# Patient Record
Sex: MALE | Race: WHITE | NOT HISPANIC OR LATINO | Employment: FULL TIME | URBAN - METROPOLITAN AREA
[De-identification: names, ages, dates, MRNs, and addresses within clinical notes are randomized per-mention and may not be internally consistent; named-entity substitution may affect disease eponyms.]

---

## 2020-08-03 ENCOUNTER — LAB (OUTPATIENT)
Dept: LAB | Facility: HOSPITAL | Age: 50
End: 2020-08-03

## 2020-08-03 ENCOUNTER — TRANSCRIBE ORDERS (OUTPATIENT)
Dept: ADMINISTRATIVE | Facility: HOSPITAL | Age: 50
End: 2020-08-03

## 2020-08-03 DIAGNOSIS — M25.562 PAIN AND SWELLING OF LEFT KNEE: Primary | ICD-10-CM

## 2020-08-03 DIAGNOSIS — M25.462 PAIN AND SWELLING OF LEFT KNEE: Primary | ICD-10-CM

## 2020-08-03 DIAGNOSIS — M25.462 PAIN AND SWELLING OF LEFT KNEE: ICD-10-CM

## 2020-08-03 DIAGNOSIS — M25.562 PAIN AND SWELLING OF LEFT KNEE: ICD-10-CM

## 2020-08-03 LAB
BASOPHILS # BLD AUTO: 0.04 10*3/MM3 (ref 0–0.2)
BASOPHILS NFR BLD AUTO: 0.4 % (ref 0–1.5)
CHROMATIN AB SERPL-ACNC: <10 IU/ML (ref 0–14)
CRP SERPL-MCNC: 0.26 MG/DL (ref 0–0.5)
DEPRECATED RDW RBC AUTO: 41.2 FL (ref 37–54)
EOSINOPHIL # BLD AUTO: 0.01 10*3/MM3 (ref 0–0.4)
EOSINOPHIL NFR BLD AUTO: 0.1 % (ref 0.3–6.2)
ERYTHROCYTE [DISTWIDTH] IN BLOOD BY AUTOMATED COUNT: 13.4 % (ref 12.3–15.4)
ERYTHROCYTE [SEDIMENTATION RATE] IN BLOOD: 19 MM/HR (ref 0–20)
HCT VFR BLD AUTO: 41.9 % (ref 37.5–51)
HGB BLD-MCNC: 13.8 G/DL (ref 13–17.7)
IMM GRANULOCYTES # BLD AUTO: 0.08 10*3/MM3 (ref 0–0.05)
IMM GRANULOCYTES NFR BLD AUTO: 0.7 % (ref 0–0.5)
LYMPHOCYTES # BLD AUTO: 1.85 10*3/MM3 (ref 0.7–3.1)
LYMPHOCYTES NFR BLD AUTO: 16.6 % (ref 19.6–45.3)
MCH RBC QN AUTO: 27.9 PG (ref 26.6–33)
MCHC RBC AUTO-ENTMCNC: 32.9 G/DL (ref 31.5–35.7)
MCV RBC AUTO: 84.6 FL (ref 79–97)
MONOCYTES # BLD AUTO: 0.64 10*3/MM3 (ref 0.1–0.9)
MONOCYTES NFR BLD AUTO: 5.8 % (ref 5–12)
NEUTROPHILS NFR BLD AUTO: 76.4 % (ref 42.7–76)
NEUTROPHILS NFR BLD AUTO: 8.51 10*3/MM3 (ref 1.7–7)
NRBC BLD AUTO-RTO: 0 /100 WBC (ref 0–0.2)
PLATELET # BLD AUTO: 502 10*3/MM3 (ref 140–450)
PMV BLD AUTO: 10.1 FL (ref 6–12)
RBC # BLD AUTO: 4.95 10*6/MM3 (ref 4.14–5.8)
URATE SERPL-MCNC: 6.4 MG/DL (ref 3.4–7)
WBC # BLD AUTO: 11.13 10*3/MM3 (ref 3.4–10.8)

## 2020-08-03 PROCEDURE — 86431 RHEUMATOID FACTOR QUANT: CPT

## 2020-08-03 PROCEDURE — 36415 COLL VENOUS BLD VENIPUNCTURE: CPT

## 2020-08-03 PROCEDURE — 86038 ANTINUCLEAR ANTIBODIES: CPT

## 2020-08-03 PROCEDURE — 84550 ASSAY OF BLOOD/URIC ACID: CPT

## 2020-08-03 PROCEDURE — 85025 COMPLETE CBC W/AUTO DIFF WBC: CPT

## 2020-08-03 PROCEDURE — 86140 C-REACTIVE PROTEIN: CPT

## 2020-08-03 PROCEDURE — 85652 RBC SED RATE AUTOMATED: CPT

## 2020-08-04 LAB — ANA SER QL: NEGATIVE

## 2020-12-11 ENCOUNTER — HOSPITAL ENCOUNTER (OUTPATIENT)
Dept: SURGERY | Facility: CLINIC | Age: 50
Discharge: HOME | End: 2020-12-11
Attending: UROLOGY

## 2020-12-11 ENCOUNTER — OFFICE VISIT CONVERTED (OUTPATIENT)
Dept: UROLOGY | Facility: CLINIC | Age: 50
End: 2020-12-11
Attending: UROLOGY

## 2020-12-11 ENCOUNTER — CONVERSION ENCOUNTER (OUTPATIENT)
Dept: SURGERY | Facility: CLINIC | Age: 50
End: 2020-12-11

## 2020-12-13 LAB — BACTERIA UR CULT: NORMAL

## 2020-12-24 ENCOUNTER — HOSPITAL ENCOUNTER (OUTPATIENT)
Dept: PREADMISSION TESTING | Facility: HOSPITAL | Age: 50
Discharge: HOME | End: 2020-12-24
Attending: UROLOGY

## 2020-12-24 LAB
ALBUMIN SERPL-MCNC: 3.5 G/DL (ref 3.5–5)
ALBUMIN/GLOB SERPL: 0.7 {RATIO} (ref 1.4–2.6)
ALP SERPL-CCNC: 87 U/L (ref 56–119)
ALT SERPL-CCNC: 11 U/L (ref 10–40)
ANION GAP SERPL CALC-SCNC: 15 MMOL/L (ref 8–19)
AST SERPL-CCNC: 12 U/L (ref 15–50)
BASOPHILS # BLD AUTO: 0.04 10*3/UL (ref 0–0.2)
BASOPHILS NFR BLD AUTO: 0.5 % (ref 0–3)
BILIRUB SERPL-MCNC: 0.21 MG/DL (ref 0.2–1.3)
BUN SERPL-MCNC: 21 MG/DL (ref 5–25)
BUN/CREAT SERPL: 22 {RATIO} (ref 6–20)
CALCIUM SERPL-MCNC: 9.8 MG/DL (ref 8.7–10.4)
CHLORIDE SERPL-SCNC: 102 MMOL/L (ref 99–111)
CONV ABS IMM GRAN: 0.06 10*3/UL (ref 0–0.2)
CONV CO2: 22 MMOL/L (ref 22–32)
CONV IMMATURE GRAN: 0.7 % (ref 0–1.8)
CONV TOTAL PROTEIN: 8.3 G/DL (ref 6.3–8.2)
CREAT UR-MCNC: 0.95 MG/DL (ref 0.7–1.2)
DEPRECATED RDW RBC AUTO: 41.3 FL (ref 35.1–43.9)
EOSINOPHIL # BLD AUTO: 0.1 10*3/UL (ref 0–0.7)
EOSINOPHIL # BLD AUTO: 1.2 % (ref 0–7)
ERYTHROCYTE [DISTWIDTH] IN BLOOD BY AUTOMATED COUNT: 13.6 % (ref 11.6–14.4)
GFR SERPLBLD BASED ON 1.73 SQ M-ARVRAT: >60 ML/MIN/{1.73_M2}
GLOBULIN UR ELPH-MCNC: 4.8 G/DL (ref 2–3.5)
GLUCOSE SERPL-MCNC: 200 MG/DL (ref 70–99)
HCT VFR BLD AUTO: 33.9 % (ref 42–52)
HGB BLD-MCNC: 11.2 G/DL (ref 14–18)
INR PPP: 1.08 (ref 2–3)
LYMPHOCYTES # BLD AUTO: 1.13 10*3/UL (ref 1–5)
LYMPHOCYTES NFR BLD AUTO: 13.9 % (ref 20–45)
MCH RBC QN AUTO: 27.5 PG (ref 27–31)
MCHC RBC AUTO-ENTMCNC: 33 G/DL (ref 33–37)
MCV RBC AUTO: 83.3 FL (ref 80–96)
MONOCYTES # BLD AUTO: 0.46 10*3/UL (ref 0.2–1.2)
MONOCYTES NFR BLD AUTO: 5.7 % (ref 3–10)
NEUTROPHILS # BLD AUTO: 6.34 10*3/UL (ref 2–8)
NEUTROPHILS NFR BLD AUTO: 78 % (ref 30–85)
NRBC CBCN: 0 % (ref 0–0.7)
OSMOLALITY SERPL CALC.SUM OF ELEC: 289 MOSM/KG (ref 273–304)
PLATELET # BLD AUTO: 463 10*3/UL (ref 130–400)
PMV BLD AUTO: 9.7 FL (ref 9.4–12.4)
POTASSIUM SERPL-SCNC: 4.2 MMOL/L (ref 3.5–5.3)
PROTHROMBIN TIME: 11.4 S (ref 9.4–12)
RBC # BLD AUTO: 4.07 10*6/UL (ref 4.7–6.1)
SODIUM SERPL-SCNC: 135 MMOL/L (ref 135–147)
WBC # BLD AUTO: 8.13 10*3/UL (ref 4.8–10.8)

## 2020-12-25 LAB — SARS-COV-2 RNA SPEC QL NAA+PROBE: NOT DETECTED

## 2021-01-15 ENCOUNTER — OFFICE VISIT CONVERTED (OUTPATIENT)
Dept: UROLOGY | Facility: CLINIC | Age: 51
End: 2021-01-15
Attending: UROLOGY

## 2021-05-10 NOTE — H&P
History and Physical      Patient Name: Riaz Chavez   Patient ID: [id number]   Sex: Male   YOB: 1970        Visit Date: December 11, 2020    Provider: Alexandre Fox MD   Location: [facility]   Location Address: [address] [place] [address]  [id number]   Location Phone: [phone]          Chief Complaint  · pt here for urologic issues      History Of Present Illness     50-year-old  gentleman here today for a right enhancing renal mass, biopsy-proven renal cell carcinoma, papillary type    9/20 MRI abdomen with and without  - 3 cm right renal mass suspicious renal cell.  Several bilateral renal cyst.  No metastatic disease identified  9/20 CT chest/abdomen/pelvis with and without - 3.2 cm lesion exophytic right kidney.    no gross hematuria, dysuria or recurrent urinary tract infections.  No history of kidney stone.    No Previous abdominal surgeries.    Patient does have a lot of trouble with osteoarthritis.    10/20 creatinine 0.95, GFR 92    Voids Without issue.    Has seen Kacy Brunner MD to get worked up to this point.    11/20 chest x-raynegative    Grandfather with prostate cancer,      9/20  Renal biopsy -right kidney, renal cell carcinoma, favor papillary renal cell type I.    No cardiopulmonary history.  Less than 1 pack/day.  Patient does take ibuprofen prn.  DM treated with meds.       Past Medical History  Allergic rhinitis, chronic; Anemia, Unspecified; Arthritis; Cancer; Diabetes         Past Surgical History  Carpal Tunnel Release         Medication List  Abilify 5 mg oral tablet; Claritin 10 mg oral tablet; iron 325 mg (65 mg iron) oral tablet; metformin 500 mg oral tablet extended release 24 hr; Zoloft 100 mg oral tablet         Allergy List  NO KNOWN DRUG ALLERGIES         Social History  Tobacco (Current every day)         Review of Systems  · Constitutional  o Denies  o : chills, fever  · Gastrointestinal  o Denies  o : nausea,  "vomiting      Vitals  Date Time BP Position Site L\R Cuff Size HR RR TEMP (F) WT  HT  BMI kg/m2 BSA m2 O2 Sat FR L/min FiO2 HC       12/11/2020 12:05 PM       17  233lbs 6oz 6'  2\" 29.96 2.35             Physical Examination  · Constitutional  o Appearance  o : Well-appearing, well-developed, in no acute distress  · Respiratory  o Respiratory Effort  o : Unlabored breathing  o Auscultation of Lungs  o : Unlabored breathing, clear to auscultation bilaterally  · Cardiovascular  o Heart  o :   § Auscultation of Heart  § : Regular rate and rhythm, no murmurs  · Gastrointestinal  o Abdominal Examination  o : Nontender, nondistended, no rigidity or guarding, no hepatosplenomegaly  · Neurologic  o Mental Status Examination  o :   § Orientation  § : Grossly oriented to person, place and time, judgment and insight intact, normal mood and affect              Assessment  · Renal mass     593.9/N28.89  · Renal cell carcinoma     189.0/C64.9      Plan  · Orders  o Urine Culture (Clean Catch) [facility] (28194) - 593.9/N28.89 - 12/11/2020  · Medications  o Medications have been Reconciled  o Transition of Care or Provider Policy  · Instructions  o Electronically Identified Patient Education Materials Provided Electronically         I had a long discussion with the patient in regards to  diagnosis of an enhancing renal mass. We discussed the significance of an enhancing small renal mass (<4 cm) that 80% of these lesions are malignant while 20% are benign. The only way to definitively know is with biopsy or removal of the mass. We discussed management options including surveillance, renal mass biopsy, percutaneous cryoablation/radiofrequency and surgical removal via both robotic and open approaches. I discussed that surveillance involves frequent imaging to monitor growth of the mass with average growth of these masses roughly maximum 0.5cm annually. We discussed renal mass biopsy and the concerns with false negative biopsy and rare " instance of cancer seeding the biopsy tract. We then discussed surgery. I stated that my goal for her is renal preservation in form of partial nephrectomy. I would do the surgery in robotic fashion and if not technically feasible, I would convert to open procedure to perform a partial nephrectomy. Nephrectomy would be a last resort if there were difficulties encountered during the operation. I discussed that with robotic operation, a likely 2 to 3 day hospital stay. The robotic approach is beneficial for a pain and healing standpoint. Risks and benefits of the procedure were discussed with the paitent  including infection, bleeding, injury to surrounding structures including bowel and other intra-abdominal organs and also anesthetic complications.  Patient understands that other complications are also possible that are not discussed including death.   All the paitents questions were answered and she voiced understanding.  I spent 60 minutes with the patient discussing diagnosis and managment options with more than half coordinating care and discussing management.  >             Electronically Signed by: Alexandre Fox MD -Author on December 11, 2020 04:27:44 PM

## 2021-05-10 NOTE — H&P
History and Physical      Patient Name: Riaz Chavez   Patient ID: [id number]   Sex: Male   YOB: 1970        Visit Date: December 11, 2020    Provider: Alexandre Fox MD   [place] [facility]   Location Address: [address] [place] [address]  [id number]   Location Phone: [phone]          Chief Complaint  · History & Physical / Surgical Orders      History Of Present Illness  [facility]  Inpatient History and Physical Surgical Orders    Preadmission Location: [facility] Preadmission Time: 09:00 AM   Which Facility: [facility] Surgery Date: 12/30/2020 Surgery Time: 02:30 PM Preadmission Testing Date: 12/24/2020   Patient's Name: Riaz Chavez YOB: 1970   Chief complaint/history present illness: Renal mass   Current Medication List: Abilify 5 mg oral tablet, Claritin 10 mg oral tablet, iron 325 mg (65 mg iron) oral tablet, metformin 500 mg oral tablet extended release 24 hr, and Zoloft 100 mg oral tablet   Allergies: NO KNOWN DRUG ALLERGIES   Significant past medical: Allergic rhinitis, chronic, Anemia, Unspecified, Arthritis, Cancer, and Diabetes   Past Surgical History: Carpal Tunnel Release   Examination of heart and lungs: Regular rate, rhythm, no murmur, gallop, rub, Breath sounds normal, no distress, and Abdomen soft, non-tender, BSx4 are positive         Past Medical History  Allergic rhinitis, chronic; Anemia, Unspecified; Arthritis; Cancer; Diabetes         Past Surgical History  Carpal Tunnel Release         Medication List  Abilify 5 mg oral tablet; Claritin 10 mg oral tablet; iron 325 mg (65 mg iron) oral tablet; metformin 500 mg oral tablet extended release 24 hr; Zoloft 100 mg oral tablet         Allergy List  NO KNOWN DRUG ALLERGIES       Allergies Reconciled  Social History  Tobacco (Current every day)         Vitals  Date Time BP Position Site L\R Cuff Size HR RR TEMP (F) WT  HT  BMI kg/m2 BSA  "m2 O2 Sat FR L/min FiO2 HC       12/11/2020 12:05 PM       17  233lbs 6oz 6'  2\" 29.96 2.35                 Assessment  · Pre-Surgical Orders     V72.84  · Preop testing     V72.84/Z01.818  · Anemia     285.9/D64.9  · Diabetes     250.00/E11.9  · Tobacco abuse     305.1/Z72.0      Plan  · Orders  o General Urology Surgery Order (UROSU) - V72.84 - 12/30/2020  o [facility] Pre-Op Covid-19 Screening (29706) - V72.84/Z01.818 - 12/24/2020   1015 at [facility]  o CBC (90187) - V72.84/Z01.818, 285.9/D64.9, 250.00/E11.9, 305.1/Z72.0 - 12/24/2020  o BMP (94521) - V72.84/Z01.818, 285.9/D64.9, 250.00/E11.9, 305.1/Z72.0 - 12/24/2020  o PT with INR (62414) - V72.84/Z01.818, 285.9/D64.9, 250.00/E11.9, 305.1/Z72.0 - 12/24/2020  o Type and Screen per unit (58688) - V72.84/Z01.818, 285.9/D64.9, 250.00/E11.9, 305.1/Z72.0 - 12/24/2020  · Medications  o Medications have been Reconciled  o Transition of Care or Provider Policy  · Instructions  o *****Surgical Orders******  o Pre-Operative Orders: Sign permit for Robotic assisted laparoscopic right partial nephrectomy possible open  o Admit for INPATIENT services; Anticipated length of stay greater than two midnights  o Apply Thromboembolic Device (FLACO) hose Pre-Operative  o Sequential Compression Devices (SCD's) Intra-Operative  o Teach Use of Incentive Spirometer Pre Operative.  o Cefotetan 2 gram IV OCTOR.  o RISK AND BENEFITS:  o Possible risks/complications, benefits and alternatives to surgical or invasive procedure have been explained to patient and/or legal guardian.  o Electronically Identified Patient Education Materials Provided Electronically            Electronically Signed by: Alexandre Fox MD -Author on December 11, 2020 04:24:39 PM  "

## 2021-05-14 VITALS — RESPIRATION RATE: 14 BRPM | HEIGHT: 73 IN | BODY MASS INDEX: 28.03 KG/M2 | WEIGHT: 211.5 LBS

## 2021-05-14 VITALS — BODY MASS INDEX: 29.95 KG/M2 | HEIGHT: 74 IN | WEIGHT: 233.37 LBS | RESPIRATION RATE: 17 BRPM

## 2021-05-14 NOTE — PROGRESS NOTES
Progress Note      Patient Name: Riaz Chavez   Patient ID: [id number]   Sex: Male   YOB: 1970    Primary Care Provider: Mark VALVERDE   Referring Provider: Mark VALVERDE    Visit Date: January 15, 2021    Provider: Alexandre Fox MD   Location: [facility]   Location Address: [address] [place] [address]  [id number]   Location Phone: [phone]          Chief Complaint  · UROLOGIC ISSUES      History Of Present Illness     50-year-old  gentleman up on papillary renal cell carcinoma pT3a, negative margins.  Lap right partial 12/20    No pain.  No GH.    Is having a lot of trouble with arthritis.  He has started back on prednisone which has helped.    12/30/2020 laparoscopic right partial nephrectomy  path -4 cm papillary renal cell carcinoma, type II, G3, tumor extends in the perinephric tissues.  Margins negative, no lymph nodes found, pT3a    9/20 MRI abdomen with and without  - 3 cm right renal mass suspicious renal cell.  Several bilateral renal cyst.  No metastatic disease identified  9/20 CT chest/abdomen/pelvis with and without - 3.2 cm lesion exophytic right kidney.    Previous    No history of kidney stone.    No Previous abdominal surgeries.    Patient does have a lot of trouble with osteoarthritis.    10/20 creatinine 0.95, GFR 92    Grandfather with prostate cancer    No cardiopulmonary history.  Less than 1 pack/day.  Patient does take ibuprofen prn.  DM treated with meds.       Past Medical History  Allergic rhinitis, chronic; Anemia, Unspecified; Arthritis; Cancer; Diabetes         Past Surgical History  Carpal Tunnel Release         Medication List  Abilify 5 mg oral tablet; Claritin 10 mg oral tablet; iron 325 mg (65 mg iron) oral tablet; metformin 500 mg oral tablet; Percocet 7.5-325 mg oral tablet; Zoloft 100 mg oral tablet         Allergy List  NO KNOWN DRUG ALLERGIES       Allergies Reconciled  Social History  Tobacco (Current  "every day)         Review of Systems  · Constitutional  o Denies  o : fatigue, chills  · HENT  o Denies  o : headaches  · Respiratory  o Denies  o : shortness of breath  · Gastrointestinal  o Denies  o : nausea, vomiting, diarrhea      Vitals  Date Time BP Position Site L\R Cuff Size HR RR TEMP (F) WT  HT  BMI kg/m2 BSA m2 O2 Sat FR L/min FiO2 HC       01/15/2021 10:49 AM       14  211lbs 8oz 6'  1\" 27.9 2.22             Physical Examination  · Constitutional  o Appearance  o : Well-appearing, well-developed, in no acute distress  · Respiratory  o Respiratory Effort  o : Unlabored breathing  · Gastrointestinal  o Abdominal Examination  o : Nontender, nondistended, no rigidity or guarding, no hepatosplenomegaly  · Neurologic  o Mental Status Examination  o :   § Orientation  § : Grossly oriented to person, place and time, judgment and insight intact, normal mood and affect         Inc are clean/dry and intact               Assessment  · Renal mass     593.9/N28.89  · Renal cell carcinoma     189.0/C64.9      Plan  · Orders  o CMP Non-fasting [facility] (52764) - 189.0/C64.9 - 07/15/2021  o CT Abdomen with IV Contrast H; suggest Oral Prep (28824) - 189.0/C64.9 - 07/09/2021   Scheduled 7/9/21 at 11:20AM at [facility]. NOTHING TO EAT OR DRINK 2HRS PRIOR. NO ORAL PREP.  · Medications  o Medications have been Reconciled  o Transition of Care or Provider Policy  · Instructions  o Electronically Identified Patient Education Materials Provided Electronically       Papillary RCC    NCCN guidelines follow-up in 6 months with a CMP and CT abdomen with contrast    Patient does have a history of colon polyps and would like to transfer his general surgery care here.  I will have him see one of our general surgeons next available to get established                   Electronically Signed by: Alexandre Fox MD -Author on January 15, 2021 01:23:14 PM  "

## 2021-05-22 ENCOUNTER — TRANSCRIBE ORDERS (OUTPATIENT)
Dept: ADMINISTRATIVE | Facility: HOSPITAL | Age: 51
End: 2021-05-22

## 2021-05-22 DIAGNOSIS — C64.9 RENAL CELL CARCINOMA, UNSPECIFIED LATERALITY (HCC): Primary | ICD-10-CM

## 2021-07-09 ENCOUNTER — HOSPITAL ENCOUNTER (OUTPATIENT)
Dept: CT IMAGING | Facility: HOSPITAL | Age: 51
Discharge: HOME | End: 2021-07-09
Admitting: UROLOGY

## 2021-07-09 DIAGNOSIS — C64.9 RENAL CELL CARCINOMA, UNSPECIFIED LATERALITY (HCC): ICD-10-CM

## 2021-07-09 LAB — CREAT BLDA-MCNC: 1 MG/DL

## 2021-07-09 PROCEDURE — 0 IOPAMIDOL PER 1 ML: Performed by: UROLOGY

## 2021-07-09 PROCEDURE — 82565 ASSAY OF CREATININE: CPT

## 2021-07-09 PROCEDURE — 74160 CT ABDOMEN W/CONTRAST: CPT

## 2021-07-09 RX ADMIN — IOPAMIDOL 100 ML: 755 INJECTION, SOLUTION INTRAVENOUS at 11:57

## 2021-07-14 RX ORDER — METFORMIN HYDROCHLORIDE 500 MG/1
1000 TABLET, EXTENDED RELEASE ORAL DAILY
COMMUNITY
Start: 2021-06-15

## 2021-07-14 RX ORDER — IBUPROFEN 800 MG/1
800 TABLET ORAL
COMMUNITY
Start: 2021-06-15

## 2021-07-14 RX ORDER — NAPROXEN 500 MG/1
500 TABLET ORAL 2 TIMES DAILY WITH MEALS
COMMUNITY
Start: 2021-04-20

## 2021-07-14 RX ORDER — SULFASALAZINE 500 MG/1
TABLET, DELAYED RELEASE ORAL
COMMUNITY
Start: 2021-04-06 | End: 2022-08-30

## 2021-07-14 RX ORDER — ARIPIPRAZOLE 5 MG/1
5 TABLET ORAL
COMMUNITY
Start: 2021-06-17 | End: 2022-08-30

## 2021-07-14 RX ORDER — FOLIC ACID 1 MG/1
1000 TABLET ORAL DAILY
COMMUNITY
Start: 2021-07-07

## 2021-07-14 RX ORDER — SERTRALINE HYDROCHLORIDE 100 MG/1
100 TABLET, FILM COATED ORAL DAILY
COMMUNITY
Start: 2021-06-15 | End: 2022-08-30

## 2021-07-14 RX ORDER — PREDNISONE 1 MG/1
TABLET ORAL
COMMUNITY
Start: 2021-06-23

## 2021-07-14 RX ORDER — FERROUS SULFATE 325(65) MG
TABLET ORAL
COMMUNITY
End: 2022-08-30

## 2021-07-14 RX ORDER — LORATADINE 10 MG/1
10 TABLET ORAL DAILY
COMMUNITY
Start: 2021-05-16

## 2021-07-14 RX ORDER — METHOTREXATE 2.5 MG/1
TABLET ORAL
COMMUNITY
Start: 2021-07-05

## 2021-07-14 RX ORDER — OXYCODONE AND ACETAMINOPHEN 7.5; 325 MG/1; MG/1
TABLET ORAL
COMMUNITY
Start: 2021-01-06 | End: 2022-08-30

## 2021-07-15 PROBLEM — C64.1 RENAL CELL CARCINOMA OF RIGHT KIDNEY (HCC): Status: ACTIVE | Noted: 2021-07-15

## 2021-07-15 NOTE — PROGRESS NOTES
Chief Complaint    Urologic complaint    Subjective          Riaz Chavez presents to [facility] UR[facility]  History of Present Illness       51-year-old  gentleman up on papillary renal cell carcinoma pT3a, negative margins.  Lap right partial 12/20    Patient is voiding okay, no gross hematuria    patient is having some deep aching on his right side.  Changes with movement.  Intermittent not always bothering him.    7/9/2021 CT abdomen with - negative.     Previous      12/30/2020 laparoscopic right partial nephrectomy  path -4 cm papillary renal cell carcinoma, type II, G3, tumor extends in the perinephric tissues.  Margins negative, no lymph nodes found, pT3a    9/20 MRI abdomen with and without  - 3 cm right renal mass suspicious renal cell.  Several bilateral renal cyst.  No metastatic disease identified  9/20 CT chest/abdomen/pelvis with and without - 3.2 cm lesion exophytic right kidney.    No history of kidney stone.    No Previous abdominal surgeries.    Patient does have a lot of trouble with osteoarthritis.    10/20 creatinine 0.95, GFR 92    Grandfather with prostate cancer    No cardiopulmonary history.  Less than 1 pack/day.  Patient does take ibuprofen prn.  DM treated with meds.         Past History:  Medical History: has a past medical history of Anemia, unspecified, Arthritis, Cancer (CMS/HCC), Chronic allergic rhinitis, and Diabetes (CMS/[facility]).   Surgical History: has a past surgical history that includes Carpal tunnel release.   Family History: family history is not on file.   Social History: reports that he has been smoking. He does not have any smokeless tobacco history on file.  Allergies: Patient has no allergy information on record.       Current Outpatient Medications:   •  ARIPiprazole (ABILIFY) 5 MG tablet, Take 5 mg by mouth every night at bedtime., Disp: , Rfl:   •  ferrous sulfate 325 (65 FE) MG tablet, iron 325 mg (65 mg iron) oral tablet take 1 tablet (325  mg) by oral route once daily   Active, Disp: , Rfl:   •  folic acid (FOLVITE) 1 MG tablet, Take 1,000 mcg by mouth Daily., Disp: , Rfl:   •  ibuprofen (ADVIL,MOTRIN) 800 MG tablet, Take 800 mg by mouth 3 (Three) Times a Day With Meals. NULL, Disp: , Rfl:   •  loratadine (CLARITIN) 10 MG tablet, Take 10 mg by mouth Daily., Disp: , Rfl:   •  metFORMIN ER (GLUCOPHAGE-XR) 500 MG 24 hr tablet, Take 1,000 mg by mouth Daily., Disp: , Rfl:   •  methotrexate 2.5 MG tablet, TAKE 5 TABLETS BY MOUTH WEEKLY TAKE ALL TABLETS ON SAME DAY EACH WEEK, Disp: , Rfl:   •  naproxen (NAPROSYN) 500 MG tablet, Take 500 mg by mouth 2 (Two) Times a Day With Meals., Disp: , Rfl:   •  oxyCODONE-acetaminophen (Percocet) 7.5-325 MG per tablet, Percocet 7.5-325 mg oral tablet take 1 tablet by oral route every 6 hours as needed 1/6/2021  Active, Disp: , Rfl:   •  predniSONE (DELTASONE) 5 MG tablet, TAKE 2 TABLETS BY MOUTH IN THE MORNING, Disp: , Rfl:   •  sertraline (ZOLOFT) 100 MG tablet, Take 100 mg by mouth Daily., Disp: , Rfl:   •  sulfaSALAzine (AZULFIDINE) 500 MG EC tablet, TAKE 1 TABLET BY MOUTH ONCE DAILY FOR 7 DAYS THEN 1 TABLET TWICE DAILY FOR 7 DAYS THEN 2 TABLETS EVERY MORNING AND 1 TABLET IN THE EVENING F, Disp: , Rfl:      Physical exam       Alert and orient x3  Well appearing, well developed, in no acute distress   Unlabored respirations  Nontender/nondistended      Grossly oriented to person, place and time, judgment is intact, normal mood and affect    Results for orders placed or performed during the hospital encounter of 07/09/21   POC Creatinine    Specimen: Blood   Result Value Ref Range    Creatinine 1.00 mg/dL    GFR MDRD       GFR MDRD Non African American 78 mL/min/1.73 sq.M        Objective     Vital Signs:   There were no vitals taken for this visit.             Assessment and Plan    Diagnoses and all orders for this visit:    1. Renal cell carcinoma of right kidney (CMS/HCC) (Primary)      CT reviewed  and discussed with the patient  Follow-up in 1 year with CT abdomen/pelvis with, CMP and chest x-ray per NCCN.

## 2021-07-16 ENCOUNTER — PREP FOR SURGERY (OUTPATIENT)
Dept: OTHER | Facility: HOSPITAL | Age: 51
End: 2021-07-16

## 2021-07-16 ENCOUNTER — OFFICE VISIT (OUTPATIENT)
Dept: UROLOGY | Facility: CLINIC | Age: 51
End: 2021-07-16

## 2021-07-16 ENCOUNTER — OFFICE VISIT (OUTPATIENT)
Dept: SURGERY | Facility: CLINIC | Age: 51
End: 2021-07-16

## 2021-07-16 VITALS — WEIGHT: 213 LBS | HEIGHT: 74 IN | BODY MASS INDEX: 27.34 KG/M2 | RESPIRATION RATE: 17 BRPM

## 2021-07-16 VITALS — WEIGHT: 213.2 LBS | HEIGHT: 74 IN | BODY MASS INDEX: 27.36 KG/M2

## 2021-07-16 DIAGNOSIS — Z12.11 COLON CANCER SCREENING: Primary | ICD-10-CM

## 2021-07-16 DIAGNOSIS — C64.1 RENAL CELL CARCINOMA OF RIGHT KIDNEY (HCC): Primary | ICD-10-CM

## 2021-07-16 PROCEDURE — 99213 OFFICE O/P EST LOW 20 MIN: CPT | Performed by: UROLOGY

## 2021-07-16 PROCEDURE — S0285 CNSLT BEFORE SCREEN COLONOSC: HCPCS | Performed by: SURGERY

## 2021-07-16 NOTE — PROGRESS NOTES
Inpatient History and Physical Surgical Orders    Preadmission Location:   Preadmission Time:  Facility:  Surgery Date:  Surgery Time:  Preadmission Test date:     Chief Complaint  Outpatient History and Physical / Surgical Orders    Primary Care Provider: Mark Daugherty APRN    Referring Provider: No ref. provider found    Subjective      Patient Name: Riaz Chavez : 1970    HPI  The patient is a 51-year-old gentleman who is referred for colon screening.  He had a colonoscopy 2 years ago and had several polyps removed and was referred back for repeat colon screening.  Otherwise he is feeling good.    Past History:  Medical History: has a past medical history of Anemia, unspecified, Arthritis, Cancer (CMS/HCC), Chronic allergic rhinitis, and Diabetes (CMS/[facility]).   Surgical History: has a past surgical history that includes Carpal tunnel release and Partial nephrectomy (Right, 2020).   Family History: family history includes No Known Problems in his father and mother.   Social History: reports that he has been smoking. He has never used smokeless tobacco.  Allergies: Patient has no known allergies.       Current Outpatient Medications:   •  ARIPiprazole (ABILIFY) 5 MG tablet, Take 5 mg by mouth every night at bedtime., Disp: , Rfl:   •  ferrous sulfate 325 (65 FE) MG tablet, iron 325 mg (65 mg iron) oral tablet take 1 tablet (325 mg) by oral route once daily   Active, Disp: , Rfl:   •  folic acid (FOLVITE) 1 MG tablet, Take 1,000 mcg by mouth Daily., Disp: , Rfl:   •  ibuprofen (ADVIL,MOTRIN) 800 MG tablet, Take 800 mg by mouth 3 (Three) Times a Day With Meals. NULL, Disp: , Rfl:   •  loratadine (CLARITIN) 10 MG tablet, Take 10 mg by mouth Daily., Disp: , Rfl:   •  metFORMIN ER (GLUCOPHAGE-XR) 500 MG 24 hr tablet, Take 1,000 mg by mouth Daily., Disp: , Rfl:   •  methotrexate 2.5 MG tablet, TAKE 5 TABLETS BY MOUTH WEEKLY TAKE ALL TABLETS ON SAME DAY EACH WEEK, Disp: , Rfl:   •  predniSONE (DELTASONE) 5  "MG tablet, TAKE 2 TABLETS BY MOUTH IN THE MORNING, Disp: , Rfl:   •  sertraline (ZOLOFT) 100 MG tablet, Take 100 mg by mouth Daily., Disp: , Rfl:   •  naproxen (NAPROSYN) 500 MG tablet, Take 500 mg by mouth 2 (Two) Times a Day With Meals., Disp: , Rfl:   •  oxyCODONE-acetaminophen (Percocet) 7.5-325 MG per tablet, Percocet 7.5-325 mg oral tablet take 1 tablet by oral route every 6 hours as needed 1/6/2021  Active, Disp: , Rfl:   •  sulfaSALAzine (AZULFIDINE) 500 MG EC tablet, TAKE 1 TABLET BY MOUTH ONCE DAILY FOR 7 DAYS THEN 1 TABLET TWICE DAILY FOR 7 DAYS THEN 2 TABLETS EVERY MORNING AND 1 TABLET IN THE EVENING F, Disp: , Rfl:        Objective   Vital Signs:   Ht 188 cm (74\")   Wt 96.7 kg (213 lb 3.2 oz)   BMI 27.37 kg/m²       Physical Exam  Vitals and nursing note reviewed.   Constitutional:       Appearance: Normal appearance. He is well-developed.   Cardiovascular:      Rate and Rhythm: Normal rate and regular rhythm.   Pulmonary:      Effort: Pulmonary effort is normal.      Breath sounds: Normal air entry.   Abdominal:      General: Bowel sounds are normal.      Palpations: Abdomen is soft.      Skin:     General: Skin is warm and dry.   Neurological:      Mental Status: He is alert and oriented to person, place, and time.      Motor: Motor function is intact.   Psychiatric:         Mood and Affect: Mood normal.       Result Review :               Assessment and Plan   Diagnoses and all orders for this visit:    1. Colon cancer screening (Primary)    We will set him up for a colonoscopy I have described the procedure to him as well as the risk and benefits and he is agreeable to proceeding.    I  Elliott Sherwood MD  07/16/2021    "

## 2021-10-08 ENCOUNTER — TELEPHONE (OUTPATIENT)
Dept: UROLOGY | Facility: CLINIC | Age: 51
End: 2021-10-08

## 2022-07-15 ENCOUNTER — HOSPITAL ENCOUNTER (OUTPATIENT)
Dept: CT IMAGING | Facility: HOSPITAL | Age: 52
Discharge: HOME | End: 2022-07-15
Admitting: UROLOGY

## 2022-07-15 DIAGNOSIS — C64.1 RENAL CELL CARCINOMA OF RIGHT KIDNEY: ICD-10-CM

## 2022-07-15 LAB
CREAT BLDA-MCNC: 1.2 MG/DL
EGFRCR SERPLBLD CKD-EPI 2021: 71.9 ML/MIN/1.73

## 2022-07-15 PROCEDURE — 74177 CT ABD & PELVIS W/CONTRAST: CPT

## 2022-07-15 PROCEDURE — 0 IOPAMIDOL PER 1 ML: Performed by: UROLOGY

## 2022-07-15 PROCEDURE — 82565 ASSAY OF CREATININE: CPT

## 2022-07-15 RX ADMIN — IOPAMIDOL 100 ML: 755 INJECTION, SOLUTION INTRAVENOUS at 08:29

## 2022-07-18 ENCOUNTER — TELEPHONE (OUTPATIENT)
Dept: UROLOGY | Facility: CLINIC | Age: 52
End: 2022-07-18

## 2022-07-18 DIAGNOSIS — K68.9 OTHER DISORDERS OF RETROPERITONEUM: Primary | ICD-10-CM

## 2022-07-18 NOTE — TELEPHONE ENCOUNTER
PET scan scheduled for 08/05/22 at 0815, patient to arrive at 0800 to [facility] on [address] [place] [address] He will follow up 08/09/22 at 1200 with Dr Fox. Gave this information to patient who verbalized understanding of all instruction via teach back. Will fax all orders to [facility].     ----- Message from Alexandre Fox MD sent at 7/15/2022  3:35 PM EDT -----  Patient with some abnormal area on his CT.  They did recommend a PET scan.  Patient is aware    Please get him a  PET scan and have him follow-up with me after.

## 2022-08-04 ENCOUNTER — TELEPHONE (OUTPATIENT)
Dept: UROLOGY | Facility: CLINIC | Age: 52
End: 2022-08-04

## 2022-08-04 NOTE — TELEPHONE ENCOUNTER
Patient was scheduled for PET CT on 08/05/22.  He said someone called him from PET CT this afternoon, and told her there was an issue with his insurance, and rescheduled it to 08/23/22.  He has a f/u appointment with Dr. Fox on 08/09/22.

## 2022-08-08 NOTE — TELEPHONE ENCOUNTER
SPOKE WITH PT AND TOLD HIM THE REASON HIS PET SCAN WAS RESCHEDULED WAS BECAUSE IT'S STILL PENDING WITH INS. [employer] HAD NOT APPROVED IT AT THAT TIME. THE [facility] IS WORKING ON GETTING IT APPROVED SO HIS PET SCAN AND APPT WAS R/S. PT WAS AWARE OF THIS AND R/S HIS APPT.

## 2022-08-11 ENCOUNTER — TELEPHONE (OUTPATIENT)
Dept: UROLOGY | Facility: CLINIC | Age: 52
End: 2022-08-11

## 2022-08-11 NOTE — TELEPHONE ENCOUNTER
Pt was scheduled for a PET scan. He got a denial letter from his insurance company and would like to know what the next step is. He is going to call his insurance company to see about an appeal.

## 2022-08-11 NOTE — TELEPHONE ENCOUNTER
Patient called back to ask about status of PET Scan auth. I explained to him that we just received a fax from [place] and will be doing a peer to peer Monday or Tuesday. If we receive auth after doing the peer to peer, we will get the scan scheduled. He verbalized understanding

## 2022-08-23 ENCOUNTER — HOSPITAL ENCOUNTER (OUTPATIENT)
Dept: PET IMAGING | Facility: HOSPITAL | Age: 52
Discharge: HOME | End: 2022-08-23

## 2022-08-23 DIAGNOSIS — K68.9 OTHER DISORDERS OF RETROPERITONEUM: ICD-10-CM

## 2022-08-23 PROCEDURE — A9552 F18 FDG: HCPCS | Performed by: UROLOGY

## 2022-08-23 PROCEDURE — 0 FLUDEOXYGLUCOSE F18 SOLUTION: Performed by: UROLOGY

## 2022-08-23 PROCEDURE — 78815 PET IMAGE W/CT SKULL-THIGH: CPT

## 2022-08-23 RX ADMIN — FLUDEOXYGLUCOSE F18 1 DOSE: 300 INJECTION INTRAVENOUS at 09:06

## 2022-08-25 ENCOUNTER — TELEPHONE (OUTPATIENT)
Dept: UROLOGY | Facility: CLINIC | Age: 52
End: 2022-08-25

## 2022-08-25 NOTE — TELEPHONE ENCOUNTER
Spoke to patient reminding him to do his CMP and CXR prior to appt Tuesday. He said he will go to [facility] tomorrow. I advised him these are walk in so he will not need an appt.

## 2022-08-26 ENCOUNTER — HOSPITAL ENCOUNTER (OUTPATIENT)
Dept: GENERAL RADIOLOGY | Facility: HOSPITAL | Age: 52
Discharge: HOME | End: 2022-08-26

## 2022-08-26 ENCOUNTER — LAB (OUTPATIENT)
Dept: LAB | Facility: HOSPITAL | Age: 52
End: 2022-08-26

## 2022-08-26 DIAGNOSIS — C64.1 RENAL CELL CARCINOMA OF RIGHT KIDNEY: ICD-10-CM

## 2022-08-26 LAB
ALBUMIN SERPL-MCNC: 4.6 G/DL (ref 3.5–5.2)
ALBUMIN/GLOB SERPL: 1.6 G/DL
ALP SERPL-CCNC: 77 U/L (ref 39–117)
ALT SERPL W P-5'-P-CCNC: 13 U/L (ref 1–41)
ANION GAP SERPL CALCULATED.3IONS-SCNC: 11.4 MMOL/L (ref 5–15)
AST SERPL-CCNC: 16 U/L (ref 1–40)
BILIRUB SERPL-MCNC: 0.6 MG/DL (ref 0–1.2)
BUN SERPL-MCNC: 16 MG/DL (ref 6–20)
BUN/CREAT SERPL: 13.1 (ref 7–25)
CALCIUM SPEC-SCNC: 9.4 MG/DL (ref 8.6–10.5)
CHLORIDE SERPL-SCNC: 103 MMOL/L (ref 98–107)
CO2 SERPL-SCNC: 24.6 MMOL/L (ref 22–29)
CREAT SERPL-MCNC: 1.22 MG/DL (ref 0.76–1.27)
EGFRCR SERPLBLD CKD-EPI 2021: 70.5 ML/MIN/1.73
GLOBULIN UR ELPH-MCNC: 2.9 GM/DL
GLUCOSE SERPL-MCNC: 100 MG/DL (ref 65–99)
POTASSIUM SERPL-SCNC: 4.7 MMOL/L (ref 3.5–5.2)
PROT SERPL-MCNC: 7.5 G/DL (ref 6–8.5)
SODIUM SERPL-SCNC: 139 MMOL/L (ref 136–145)

## 2022-08-26 PROCEDURE — 71046 X-RAY EXAM CHEST 2 VIEWS: CPT

## 2022-08-26 PROCEDURE — 80053 COMPREHEN METABOLIC PANEL: CPT

## 2022-08-26 PROCEDURE — 36415 COLL VENOUS BLD VENIPUNCTURE: CPT

## 2022-08-27 PROBLEM — Z85.528 HISTORY OF RENAL CELL CARCINOMA: Status: ACTIVE | Noted: 2022-08-27

## 2022-08-27 NOTE — PROGRESS NOTES
Chief Complaint    Urologic complaint    Subjective          Riaz Chavez presents to [facility]  History of Present Illness       52-year-old  gentleman     h/o papillary renal cell carcinoma pT3a, negative margins.  Lap right partial 12/20      Voiding without issue.  No changes to his medical history    No GH    Patient follows up today with PET scan after having some abnormal CT    8/22 chest x-ray - negative  8/22 1.2, GFR 70    8/23/2022 CT/PET-soft tissue density in the right pericolic gutter appears more contracted on today's examination with mild metabolic activity.  Fat interspersed in this area on CT 7/21.  May represent fat necrosis malignancy less likely      7/22 CT abdomen/pelvis with - postsurgical changes from partial nephrectomy right kidney.  No evidence of abnormality.  Increased nodular density in the inferior aspect of the right pericolic gutter compared to prior study.  Increased solid characteristic nodular components is noted.  Worrisome for potential malignancy.  Evaluate with PET/CT     Previous    7/9/2021 CT abdomen with - negative.     12/30/2020 laparoscopic right partial nephrectomy  path -4 cm papillary renal cell carcinoma, type II, G3, tumor extends in the perinephric tissues.  Margins negative, no lymph nodes found, pT3a    9/20 MRI abdomen with and without  - 3 cm right renal mass suspicious renal cell.  Several bilateral renal cyst.  No metastatic disease identified  9/20 CT chest/abdomen/pelvis with and without - 3.2 cm lesion exophytic right kidney.    No history of kidney stone.      Patient does have a lot of trouble with osteoarthritis.    10/20 creatinine 0.95, GFR 92    Grandfather with prostate cancer    No cardiopulmonary history.  Less than 1 pack/day.  Patient does take ibuprofen prn.  DM treated with meds.         Past History:  Medical History: has a past medical history of Anemia, unspecified, Arthritis, Cancer (CMS/HCC), Chronic  allergic rhinitis, and Diabetes (CMS/[facility]).   Surgical History: has a past surgical history that includes Carpal tunnel release and Partial nephrectomy (Right, 2020).   Family History: family history includes No Known Problems in his father and mother.   Social History: reports that he has been smoking. He has been smoking about 1.00 pack per day. He has never used smokeless tobacco.  Allergies: Patient has no known allergies.       Current Outpatient Medications:   •  ARIPiprazole (ABILIFY) 5 MG tablet, Take 5 mg by mouth every night at bedtime., Disp: , Rfl:   •  ferrous sulfate 325 (65 FE) MG tablet, iron 325 mg (65 mg iron) oral tablet take 1 tablet (325 mg) by oral route once daily   Active, Disp: , Rfl:   •  folic acid (FOLVITE) 1 MG tablet, Take 1,000 mcg by mouth Daily., Disp: , Rfl:   •  ibuprofen (ADVIL,MOTRIN) 800 MG tablet, Take 800 mg by mouth 3 (Three) Times a Day With Meals. NULL, Disp: , Rfl:   •  loratadine (CLARITIN) 10 MG tablet, Take 10 mg by mouth Daily., Disp: , Rfl:   •  metFORMIN ER (GLUCOPHAGE-XR) 500 MG 24 hr tablet, Take 1,000 mg by mouth Daily., Disp: , Rfl:   •  methotrexate 2.5 MG tablet, TAKE 5 TABLETS BY MOUTH WEEKLY TAKE ALL TABLETS ON SAME DAY EACH WEEK, Disp: , Rfl:   •  naproxen (NAPROSYN) 500 MG tablet, Take 500 mg by mouth 2 (Two) Times a Day With Meals., Disp: , Rfl:   •  oxyCODONE-acetaminophen (Percocet) 7.5-325 MG per tablet, Percocet 7.5-325 mg oral tablet take 1 tablet by oral route every 6 hours as needed 1/6/2021  Active, Disp: , Rfl:   •  predniSONE (DELTASONE) 5 MG tablet, TAKE 2 TABLETS BY MOUTH IN THE MORNING, Disp: , Rfl:   •  sertraline (ZOLOFT) 100 MG tablet, Take 100 mg by mouth Daily., Disp: , Rfl:   •  sulfaSALAzine (AZULFIDINE) 500 MG EC tablet, TAKE 1 TABLET BY MOUTH ONCE DAILY FOR 7 DAYS THEN 1 TABLET TWICE DAILY FOR 7 DAYS THEN 2 TABLETS EVERY MORNING AND 1 TABLET IN THE EVENING F, Disp: , Rfl:      Physical exam       Alert and orient x3  Well appearing,  well developed, in no acute distress   Unlabored respirations  Nontender/nondistended      Grossly oriented to person, place and time, judgment is intact, normal mood and affect    Results for orders placed or performed in visit on 08/26/22   Comprehensive Metabolic Panel    Specimen: Blood   Result Value Ref Range    Glucose 100 (H) 65 - 99 mg/dL    BUN 16 6 - 20 mg/dL    Creatinine 1.22 0.76 - 1.27 mg/dL    Sodium 139 136 - 145 mmol/L    Potassium 4.7 3.5 - 5.2 mmol/L    Chloride 103 98 - 107 mmol/L    CO2 24.6 22.0 - 29.0 mmol/L    Calcium 9.4 8.6 - 10.5 mg/dL    Total Protein 7.5 6.0 - 8.5 g/dL    Albumin 4.60 3.50 - 5.20 g/dL    ALT (SGPT) 13 1 - 41 U/L    AST (SGOT) 16 1 - 40 U/L    Alkaline Phosphatase 77 39 - 117 U/L    Total Bilirubin 0.6 0.0 - 1.2 mg/dL    Globulin 2.9 gm/dL    A/G Ratio 1.6 g/dL    BUN/Creatinine Ratio 13.1 7.0 - 25.0    Anion Gap 11.4 5.0 - 15.0 mmol/L    eGFR 70.5 >60.0 mL/min/1.73        Objective     Vital Signs:   There were no vitals taken for this visit.             Assessment and Plan    Diagnoses and all orders for this visit:    1. History of renal cell carcinoma (Primary)      PET/CT discussed and reviewed with the patient      Based on CT findings patient had a PET done, the areas in the right pericolic gutter appear to be more contracted and thought to be fat necrosis, malignancy thought to be less likely.  This was discussed with the patient we will plan on repeat CT PET in 6 months to rule out metastatic disease.

## 2022-08-30 ENCOUNTER — OFFICE VISIT (OUTPATIENT)
Dept: UROLOGY | Facility: CLINIC | Age: 52
End: 2022-08-30

## 2022-08-30 VITALS — WEIGHT: 220 LBS | BODY MASS INDEX: 28.23 KG/M2 | HEIGHT: 74 IN

## 2022-08-30 DIAGNOSIS — Z85.528 HISTORY OF RENAL CELL CARCINOMA: Primary | ICD-10-CM

## 2022-08-30 PROCEDURE — 99213 OFFICE O/P EST LOW 20 MIN: CPT | Performed by: UROLOGY

## 2022-08-30 RX ORDER — UPADACITINIB 15 MG/1
TABLET, EXTENDED RELEASE ORAL
COMMUNITY
Start: 2022-08-17

## 2022-08-30 RX ORDER — BUPROPION HYDROCHLORIDE 150 MG/1
150 TABLET, EXTENDED RELEASE ORAL DAILY
COMMUNITY
Start: 2022-08-22

## 2022-08-30 RX ORDER — FLUOXETINE HYDROCHLORIDE 40 MG/1
40 CAPSULE ORAL DAILY
COMMUNITY
Start: 2022-08-22

## 2023-02-16 ENCOUNTER — TELEPHONE (OUTPATIENT)
Dept: UROLOGY | Facility: CLINIC | Age: 53
End: 2023-02-16
Payer: COMMERCIAL

## 2023-02-16 NOTE — TELEPHONE ENCOUNTER
Caller: HASMUKH SANTIAGO        Romero call back number: [phone]    Patient is needing: PT COULD NOT SCHEDULE HIS CT/PET UNTIL 9 MAR.  NO APPT IS AVAILABLE WITH DR SAN UNTIL June.  CAN YOU PLEASE SCHEDULE PT TO SEE DR A FEW DAYS AFTER HIS CT/PET ON 9 MAR?  PLEASE CALL PT TO SCHEDULE

## 2023-03-09 ENCOUNTER — HOSPITAL ENCOUNTER (OUTPATIENT)
Dept: PET IMAGING | Facility: HOSPITAL | Age: 53
Discharge: HOME | End: 2023-03-09
Payer: COMMERCIAL

## 2023-03-09 DIAGNOSIS — Z85.528 HISTORY OF RENAL CELL CARCINOMA: ICD-10-CM

## 2023-03-09 PROCEDURE — 78815 PET IMAGE W/CT SKULL-THIGH: CPT

## 2023-03-09 PROCEDURE — A9552 F18 FDG: HCPCS | Performed by: UROLOGY

## 2023-03-09 PROCEDURE — 0 FLUDEOXYGLUCOSE F18 SOLUTION: Performed by: UROLOGY

## 2023-03-09 RX ADMIN — FLUDEOXYGLUCOSE F18 1 DOSE: 300 INJECTION INTRAVENOUS at 08:51

## 2023-03-10 NOTE — PROGRESS NOTES
Chief Complaint    Urologic complaint    Subjective          Riaz Chavez presents to [facility]  History of Present Illness       53-year-old  gentleman     h/o papillary renal cell carcinoma pT3a, negative margins.  Lap right partial 12/20        3/9/2023 PET -negative      Voiding without issue.      No GH/ UTI      No cardiopulmonary history.  Less than 1 pack/day.    No anticoagulation      Previous    8/22 chest x-ray - negative  8/22 1.2, GFR 70    8/23/2022 CT/PET-soft tissue density in the right pericolic gutter appears more contracted on today's examination with mild metabolic activity.  Fat interspersed in this area on CT 7/21.  May represent fat necrosis malignancy less likely      7/22 CT abdomen/pelvis with - postsurgical changes from partial nephrectomy right kidney.  No evidence of abnormality.  Increased nodular density in the inferior aspect of the right pericolic gutter compared to prior study.  Increased solid characteristic nodular components is noted.  Worrisome for potential malignancy.  Evaluate with PET/CT     7/9/2021 CT abdomen with - negative.     12/30/2020 laparoscopic right partial nephrectomy  path -4 cm papillary renal cell carcinoma, type II, G3, tumor extends in the perinephric tissues.  Margins negative, no lymph nodes found, pT3a    9/20 MRI abdomen with and without  - 3 cm right renal mass suspicious renal cell.  Several bilateral renal cyst.  No metastatic disease identified  9/20 CT chest/abdomen/pelvis with and without - 3.2 cm lesion exophytic right kidney.    No history of kidney stone.      Patient does have a lot of trouble with osteoarthritis.    10/20 creatinine 0.95, GFR 92    Grandfather with prostate cancer    DM treated with meds.         Past History:  Medical History: has a past medical history of Anemia, unspecified, Arthritis, Cancer (HCC), Chronic allergic rhinitis, and Diabetes (HCC).   Surgical History: has a past surgical  history that includes Carpal tunnel release and Partial nephrectomy (Right, 2020).   Family History: family history includes No Known Problems in his father and mother.   Social History: reports that he has been smoking. He has been smoking an average of 1 pack per day. He has never used smokeless tobacco.  Allergies: Patient has no known allergies.       Current Outpatient Medications:   •  buPROPion SR (WELLBUTRIN SR) 150 MG 12 hr tablet, Take 150 mg by mouth Daily., Disp: , Rfl:   •  FLUoxetine (PROzac) 40 MG capsule, Take 40 mg by mouth Daily., Disp: , Rfl:   •  folic acid (FOLVITE) 1 MG tablet, Take 1,000 mcg by mouth Daily., Disp: , Rfl:   •  ibuprofen (ADVIL,MOTRIN) 800 MG tablet, Take 800 mg by mouth 3 (Three) Times a Day With Meals. NULL, Disp: , Rfl:   •  loratadine (CLARITIN) 10 MG tablet, Take 10 mg by mouth Daily., Disp: , Rfl:   •  metFORMIN ER (GLUCOPHAGE-XR) 500 MG 24 hr tablet, Take 1,000 mg by mouth Daily., Disp: , Rfl:   •  methotrexate 2.5 MG tablet, TAKE 5 TABLETS BY MOUTH WEEKLY TAKE ALL TABLETS ON SAME DAY EACH WEEK, Disp: , Rfl:   •  naproxen (NAPROSYN) 500 MG tablet, Take 500 mg by mouth 2 (Two) Times a Day With Meals., Disp: , Rfl:   •  predniSONE (DELTASONE) 5 MG tablet, TAKE 2 TABLETS BY MOUTH IN THE MORNING, Disp: , Rfl:   •  Rinvoq 15 MG tablet sustained-release 24 hour, , Disp: , Rfl:   No current facility-administered medications for this visit.              Objective     Vital Signs:   There were no vitals taken for this visit.             Assessment and Plan    Diagnoses and all orders for this visit:    1. History of renal cell carcinoma (Primary)          PET scan looked okay, patient given reassurance    Per NCCN guidelines follow-up in 1 year with CT abdomen/pelvis with, chest x-ray and CMP

## 2023-03-13 ENCOUNTER — OFFICE VISIT (OUTPATIENT)
Dept: UROLOGY | Facility: CLINIC | Age: 53
End: 2023-03-13
Payer: COMMERCIAL

## 2023-03-13 VITALS — RESPIRATION RATE: 19 BRPM

## 2023-03-13 DIAGNOSIS — Z85.528 HISTORY OF RENAL CELL CARCINOMA: Primary | ICD-10-CM

## 2023-03-13 PROCEDURE — 99213 OFFICE O/P EST LOW 20 MIN: CPT | Performed by: UROLOGY

## 2023-04-03 ENCOUNTER — TRANSCRIBE ORDERS (OUTPATIENT)
Dept: ADMINISTRATIVE | Facility: HOSPITAL | Age: 53
End: 2023-04-03
Payer: COMMERCIAL

## 2023-04-03 DIAGNOSIS — Z79.52 LONG TERM CURRENT USE OF SYSTEMIC STEROIDS: ICD-10-CM

## 2023-04-03 DIAGNOSIS — M06.9 RHEUMATOID ARTHRITIS, INVOLVING UNSPECIFIED SITE, UNSPECIFIED WHETHER RHEUMATOID FACTOR PRESENT: Primary | ICD-10-CM

## 2023-04-24 ENCOUNTER — HOSPITAL ENCOUNTER (OUTPATIENT)
Dept: BONE DENSITY | Facility: HOSPITAL | Age: 53
Discharge: HOME | End: 2023-04-24
Admitting: INTERNAL MEDICINE
Payer: COMMERCIAL

## 2023-04-24 DIAGNOSIS — Z79.52 LONG TERM CURRENT USE OF SYSTEMIC STEROIDS: ICD-10-CM

## 2023-04-24 DIAGNOSIS — M06.9 RHEUMATOID ARTHRITIS, INVOLVING UNSPECIFIED SITE, UNSPECIFIED WHETHER RHEUMATOID FACTOR PRESENT: ICD-10-CM

## 2023-04-24 PROCEDURE — 77080 DXA BONE DENSITY AXIAL: CPT

## 2024-03-15 ENCOUNTER — TELEPHONE (OUTPATIENT)
Dept: UROLOGY | Facility: CLINIC | Age: 54
End: 2024-03-15

## 2024-03-15 NOTE — TELEPHONE ENCOUNTER
CALLED PT TO OFFER R/S OF CX'D APPT W/ JASWINDER 3/15. PT WILL NEED TO RS CT PRIOR TO APPT    SPOKE W/ PT WHO STATED HE WANTED TO SCHEDULE CT PRIOR TO R/S WITH JASWINDER. PROVIDED SCHEDULING NUMBER [phone] OPTION 3.    ANYTHING ELSE TO DO?

## 2024-03-22 ENCOUNTER — TELEPHONE (OUTPATIENT)
Dept: UROLOGY | Facility: CLINIC | Age: 54
End: 2024-03-22
Payer: COMMERCIAL

## 2024-03-22 DIAGNOSIS — Z85.528 HISTORY OF RENAL CELL CARCINOMA: Primary | ICD-10-CM

## 2024-03-22 NOTE — TELEPHONE ENCOUNTER
Provider: THANIA SAN    Caller: HASMUKH SANTIAGO    Relationship to Patient: SELF    Reason for Call: NEEDS UPDATE ORDER FOR CT SCAN PLACED     When was the patient last seen: 03-13-23    PLEASE REACH OUT TO THE PATIENT ONCE THE ORDER IS PLACED. HE HAS BEEN GIVEN THE NUMBER FOR CENTRALIZED SCHEDULING

## 2024-03-22 NOTE — TELEPHONE ENCOUNTER
CALLED PT NO ANSWER, LMOM.   Mother brings in patient with c/o fever 101.3, cough for last 3 days. Mother states their is RSV going around at daughters .

## 2024-05-09 ENCOUNTER — HOSPITAL ENCOUNTER (OUTPATIENT)
Dept: GENERAL RADIOLOGY | Facility: HOSPITAL | Age: 54
Discharge: HOME | End: 2024-05-09
Payer: COMMERCIAL

## 2024-05-09 ENCOUNTER — LAB (OUTPATIENT)
Dept: LAB | Facility: HOSPITAL | Age: 54
End: 2024-05-09
Payer: COMMERCIAL

## 2024-05-09 ENCOUNTER — HOSPITAL ENCOUNTER (OUTPATIENT)
Dept: CT IMAGING | Facility: HOSPITAL | Age: 54
Discharge: HOME | End: 2024-05-09
Payer: COMMERCIAL

## 2024-05-09 DIAGNOSIS — Z85.528 HISTORY OF RENAL CELL CARCINOMA: ICD-10-CM

## 2024-05-09 PROCEDURE — 71046 X-RAY EXAM CHEST 2 VIEWS: CPT

## 2024-05-09 PROCEDURE — 36415 COLL VENOUS BLD VENIPUNCTURE: CPT

## 2024-05-09 PROCEDURE — 74176 CT ABD & PELVIS W/O CONTRAST: CPT

## 2024-05-09 PROCEDURE — 80053 COMPREHEN METABOLIC PANEL: CPT

## 2024-05-10 LAB
ALBUMIN SERPL-MCNC: 4.4 G/DL (ref 3.5–5.2)
ALBUMIN/GLOB SERPL: 1.6 G/DL
ALP SERPL-CCNC: 81 U/L (ref 39–117)
ALT SERPL W P-5'-P-CCNC: 9 U/L (ref 1–41)
ANION GAP SERPL CALCULATED.3IONS-SCNC: 11.1 MMOL/L (ref 5–15)
AST SERPL-CCNC: 11 U/L (ref 1–40)
BILIRUB SERPL-MCNC: 0.2 MG/DL (ref 0–1.2)
BUN SERPL-MCNC: 19 MG/DL (ref 6–20)
BUN/CREAT SERPL: 14.7 (ref 7–25)
CALCIUM SPEC-SCNC: 9.4 MG/DL (ref 8.6–10.5)
CHLORIDE SERPL-SCNC: 106 MMOL/L (ref 98–107)
CO2 SERPL-SCNC: 24.9 MMOL/L (ref 22–29)
CREAT SERPL-MCNC: 1.29 MG/DL (ref 0.76–1.27)
EGFRCR SERPLBLD CKD-EPI 2021: 65.1 ML/MIN/1.73
GLOBULIN UR ELPH-MCNC: 2.8 GM/DL
GLUCOSE SERPL-MCNC: 76 MG/DL (ref 65–99)
POTASSIUM SERPL-SCNC: 4.4 MMOL/L (ref 3.5–5.2)
PROT SERPL-MCNC: 7.2 G/DL (ref 6–8.5)
SODIUM SERPL-SCNC: 142 MMOL/L (ref 136–145)

## 2024-05-18 NOTE — PROGRESS NOTES
Chief Complaint    Urologic complaint    Subjective          Riaz Chavez presents to [facility]  History of Present Illness       54-year-old  gentleman     h/o papillary renal cell carcinoma pT3a, negative margins.  Lap right partial 12/20  BPH      Patient having slower stream for several years.  Some intermittency of stream.  No straining.  Nocturia x 2.  Not bothersome.      No gross hematuria      5/24 CT abdomen with and without contrast - findings along the right paracolic gutter similar to previous exams favored to be related to fat necrosis.  5/24 1.2, GFR 65, chest x-ray - negative        No cardiopulmonary history.  Smoker,  0.5 pack/day.     No anticoagulation    PVR    5/24    068      previous      3/9/2023 PET -negative      8/22 chest x-ray - negative  8/22 1.2, GFR 70    8/23/2022 CT/PET-soft tissue density in the right pericolic gutter appears more contracted on today's examination with mild metabolic activity.  Fat interspersed in this area on CT 7/21.  May represent fat necrosis malignancy less likely      7/22 CT abdomen/pelvis with - postsurgical changes from partial nephrectomy right kidney.  No evidence of abnormality.  Increased nodular density in the inferior aspect of the right pericolic gutter compared to prior study.  Increased solid characteristic nodular components is noted.  Worrisome for potential malignancy.  Evaluate with PET/CT     7/9/2021 CT abdomen with - negative.     12/30/2020 laparoscopic right partial nephrectomy  path -4 cm papillary renal cell carcinoma, type II, G3, tumor extends in the perinephric tissues.  Margins negative, no lymph nodes found, pT3a    9/20 MRI abdomen with and without  - 3 cm right renal mass suspicious renal cell.  Several bilateral renal cyst.  No metastatic disease identified  9/20 CT chest/abdomen/pelvis with and without - 3.2 cm lesion exophytic right kidney.    No history of kidney stone.      Patient does  have a lot of trouble with osteoarthritis.    10/20 creatinine 0.95, GFR 92    Grandfather with prostate cancer    DM treated with meds.         Past History:  Medical History: has a past medical history of Anemia, unspecified, Arthritis, Cancer, Chronic allergic rhinitis, and Diabetes.   Surgical History: has a past surgical history that includes Carpal tunnel release and Partial nephrectomy (Right, 2020).   Family History: family history includes No Known Problems in his father and mother.   Social History: reports that he has been smoking. He has never used smokeless tobacco. He reports that he does not currently use alcohol. Drug use questions deferred to the physician.  Allergies: Patient has no known allergies.       Current Outpatient Medications:     buPROPion SR (WELLBUTRIN SR) 150 MG 12 hr tablet, Take 150 mg by mouth Daily., Disp: , Rfl:     FLUoxetine (PROzac) 40 MG capsule, Take 40 mg by mouth Daily., Disp: , Rfl:     folic acid (FOLVITE) 1 MG tablet, Take 1,000 mcg by mouth Daily., Disp: , Rfl:     ibuprofen (ADVIL,MOTRIN) 800 MG tablet, Take 800 mg by mouth 3 (Three) Times a Day With Meals. NULL, Disp: , Rfl:     loratadine (CLARITIN) 10 MG tablet, Take 10 mg by mouth Daily., Disp: , Rfl:     metFORMIN ER (GLUCOPHAGE-XR) 500 MG 24 hr tablet, Take 1,000 mg by mouth Daily., Disp: , Rfl:     methotrexate 2.5 MG tablet, TAKE 5 TABLETS BY MOUTH WEEKLY TAKE ALL TABLETS ON SAME DAY EACH WEEK, Disp: , Rfl:     naproxen (NAPROSYN) 500 MG tablet, Take 500 mg by mouth 2 (Two) Times a Day With Meals., Disp: , Rfl:     predniSONE (DELTASONE) 5 MG tablet, TAKE 2 TABLETS BY MOUTH IN THE MORNING, Disp: , Rfl:     Rinvoq 15 MG tablet sustained-release 24 hour, , Disp: , Rfl:               Objective     Vital Signs:   There were no vitals taken for this visit.        Bladder Scan interpretation 05/21/2024    Estimation of residual urine via BVI 3000 [place] Bladder Scan  MA/nurse performing: Lisandro MIMS  Residual Urine:  68 ml  Indication: History of renal cell carcinoma    Benign prostatic hyperplasia with lower urinary tract symptoms, symptom details unspecified   Position: Supine  Examination: Incremental scanning of the suprapubic area using 2.0 MHz transducer using copious amounts of acoustic gel.   Findings: An anechoic area was demonstrated which represented the bladder, with measurement of residual urine as noted. I inspected this myself. In that the residual urine was stable or insignificant, refer to plan for treatment and plan necessary at this time.            Assessment and Plan    Diagnoses and all orders for this visit:    1. History of renal cell carcinoma (Primary)        Per NCCN guidelines follow-up in 1 year with CT abdomen/pelvis with, chest x-ray and CMP, this coming year will be his last year for needing imaging      BPH      Not bothered enough at this time to warrant medication.       PVR at follow-up

## 2024-05-21 ENCOUNTER — OFFICE VISIT (OUTPATIENT)
Dept: UROLOGY | Facility: CLINIC | Age: 54
End: 2024-05-21
Payer: COMMERCIAL

## 2024-05-21 VITALS — HEIGHT: 74 IN | BODY MASS INDEX: 28.23 KG/M2 | WEIGHT: 220 LBS

## 2024-05-21 DIAGNOSIS — Z85.528 HISTORY OF RENAL CELL CARCINOMA: Primary | ICD-10-CM

## 2024-05-21 DIAGNOSIS — N40.1 BENIGN PROSTATIC HYPERPLASIA WITH LOWER URINARY TRACT SYMPTOMS, SYMPTOM DETAILS UNSPECIFIED: ICD-10-CM

## 2024-05-21 LAB — SPECIMEN VOL 24H UR: NORMAL L

## 2025-03-31 ENCOUNTER — TRANSCRIBE ORDERS (OUTPATIENT)
Dept: ADMINISTRATIVE | Facility: HOSPITAL | Age: 55
End: 2025-03-31
Payer: COMMERCIAL

## 2025-03-31 DIAGNOSIS — M81.0 OSTEOPOROSIS WITHOUT PATHOLOGICAL FRACTURE: Primary | ICD-10-CM

## 2025-04-29 ENCOUNTER — HOSPITAL ENCOUNTER (OUTPATIENT)
Dept: BONE DENSITY | Facility: HOSPITAL | Age: 55
Discharge: HOME | End: 2025-04-29
Admitting: INTERNAL MEDICINE
Payer: COMMERCIAL

## 2025-04-29 DIAGNOSIS — M81.0 OSTEOPOROSIS WITHOUT PATHOLOGICAL FRACTURE: ICD-10-CM

## 2025-04-29 PROCEDURE — 77080 DXA BONE DENSITY AXIAL: CPT

## 2025-05-14 ENCOUNTER — LAB (OUTPATIENT)
Dept: LAB | Facility: HOSPITAL | Age: 55
End: 2025-05-14
Payer: COMMERCIAL

## 2025-05-14 ENCOUNTER — HOSPITAL ENCOUNTER (OUTPATIENT)
Dept: CT IMAGING | Facility: HOSPITAL | Age: 55
Discharge: HOME | End: 2025-05-14
Payer: COMMERCIAL

## 2025-05-14 ENCOUNTER — HOSPITAL ENCOUNTER (OUTPATIENT)
Dept: GENERAL RADIOLOGY | Facility: HOSPITAL | Age: 55
Discharge: HOME | End: 2025-05-14
Payer: COMMERCIAL

## 2025-05-14 DIAGNOSIS — Z85.528 HISTORY OF RENAL CELL CARCINOMA: ICD-10-CM

## 2025-05-14 LAB
ALBUMIN SERPL-MCNC: 4 G/DL (ref 3.5–5.2)
ALBUMIN/GLOB SERPL: 1.2 G/DL
ALP SERPL-CCNC: 82 U/L (ref 39–117)
ALT SERPL W P-5'-P-CCNC: 20 U/L (ref 1–41)
ANION GAP SERPL CALCULATED.3IONS-SCNC: 10.1 MMOL/L (ref 5–15)
AST SERPL-CCNC: 23 U/L (ref 1–40)
BILIRUB SERPL-MCNC: 0.3 MG/DL (ref 0–1.2)
BUN SERPL-MCNC: 12 MG/DL (ref 6–20)
BUN/CREAT SERPL: 8.7 (ref 7–25)
CALCIUM SPEC-SCNC: 8.6 MG/DL (ref 8.6–10.5)
CHLORIDE SERPL-SCNC: 106 MMOL/L (ref 98–107)
CO2 SERPL-SCNC: 23.9 MMOL/L (ref 22–29)
CREAT SERPL-MCNC: 1.38 MG/DL (ref 0.76–1.27)
EGFRCR SERPLBLD CKD-EPI 2021: 59.6 ML/MIN/1.73
GLOBULIN UR ELPH-MCNC: 3.3 GM/DL
GLUCOSE SERPL-MCNC: 89 MG/DL (ref 65–99)
POTASSIUM SERPL-SCNC: 4.2 MMOL/L (ref 3.5–5.2)
PROT SERPL-MCNC: 7.3 G/DL (ref 6–8.5)
SODIUM SERPL-SCNC: 140 MMOL/L (ref 136–145)

## 2025-05-14 PROCEDURE — 71046 X-RAY EXAM CHEST 2 VIEWS: CPT

## 2025-05-14 PROCEDURE — 74177 CT ABD & PELVIS W/CONTRAST: CPT

## 2025-05-14 PROCEDURE — 25510000001 IOPAMIDOL PER 1 ML: Performed by: UROLOGY

## 2025-05-14 PROCEDURE — 80053 COMPREHEN METABOLIC PANEL: CPT

## 2025-05-14 PROCEDURE — 36415 COLL VENOUS BLD VENIPUNCTURE: CPT

## 2025-05-14 RX ORDER — IOPAMIDOL 755 MG/ML
100 INJECTION, SOLUTION INTRAVASCULAR
Status: COMPLETED | OUTPATIENT
Start: 2025-05-14 | End: 2025-05-14

## 2025-05-14 RX ADMIN — IOPAMIDOL 100 ML: 755 INJECTION, SOLUTION INTRAVENOUS at 08:29

## 2025-05-22 ENCOUNTER — TELEPHONE (OUTPATIENT)
Dept: UROLOGY | Age: 55
End: 2025-05-22
Payer: COMMERCIAL

## 2025-05-30 NOTE — PROGRESS NOTES
Chief Complaint: Benign Prostatic Hypertrophy and History of renal cell carcinoma    Subjective         History of Present Illness  Riaz Chavez is a 55 y.o. male presents to [facility] to be seen for follow-up.    Patient was previously seen by Dr. Alexandre Fox with last visit on 5/21/2024 for history of renal cell carcinoma with a right partial nephrectomy on 12/20,BPH.  Per [facility] guidelines he was to follow-up with CT abdomen pelvis, chest x-ray and CMP.  This will be his last year screening.  For his BPH no medication was warranted at that time.  He was advised on 5/22/2025 but he does not need to have any further screening in his imaging and labs were normal.  He is here for follow-up of BPH.    History of Present Illness  The patient is a 55-year-old male here for follow-up of benign prostatic hyperplasia (BPH).    He reports an increase in urinary frequency, which he attributes to his diabetes. He also notes a decrease in the strength of his urinary stream, but does not express any concern or discomfort related to this symptom.     He is uncertain about the status of his Prostate-Specific Antigen (PSA) levels, as they have not been discussed with his primary care physician.    CMP  5/14/2025 BUN 12, creatinine 1.38, GFR 59.6      CT ABDOMEN PELVIS W CONTRAST     Date of Exam: 5/14/2025 8:17 AM EDT     Indication: history of renal cell carcinoma  testicular mass.     Comparison: None available.      Findings:  Lung Bases:    The visualized lung bases and lower mediastinal structures are unremarkable.        Liver:Liver is normal in size and CT density. No focal lesions.        Biliary/Gallbladder: The gallbladder is without evidence of radiopaque stones. The biliary tree is nondilated.        Spleen:Spleen is normal in size and CT density.     Pancreas:   Pancreas shows homogeneous density. There is no evidence of pancreatic mass or peripancreatic fluid.        Kidneys: Again noted  is postsurgical changes in the upper pole right kidney post tumor resection. There is a tiny cyst in the upper pole right kidney. Additional cortical cyst in the interpolar right kidney. There is no CT evidence of recurrent disease.   There is no hydronephrosis or nephrolithiasis. The left kidney shows 2 tiny cysts. There is no hydronephrosis or nephrolithiasis        Adrenals: Stable hypodense right adrenal nodule compatible with an adenoma. The left adrenal gland is unremarkable        Retroperitoneal/Lymph Nodes/Vasculature: No retroperitoneal adenopathy is identified by size criteria.        Gastrointestinal/Mesentery: The bowel loops are non-dilated without definite wall thickening. The appendix appears within normal limits. No evidence of obstruction. No free air. No free fluid. Stable scarring in the right paracolic gutter        Bladder: The bladder is unremarkable     No acute abnormalities in the pelvis      Bony Structures:  Visualized bony structures are consistent with the patient's age. Right hip arthroplasty changes        IMPRESSION:  Impression:  1.No evidence of metastatic disease in the abdomen or pelvis.  2.Stable postsurgical changes in the upper pole right kidney post tumor resection. No CT evidence of recurrent disease.  3.Additional chronic findings as above.        Electronically Signed: Isidro Mcgill MD       XR CHEST 2 VW     Date of Exam: 5/14/2025 7:59 AM EDT     Indication: history of renal cell carcinoma     Comparison: 5/9/2024     FINDINGS:     The lungs are well-expanded. The heart and pulmonary vasculature are within normal limits. No pleural effusions are identified. There are no active appearing infiltrates.     IMPRESSION:  No active disease.        Electronically Signed: Olivier Norris MD     Previous 5/21/2024:    h/o papillary renal cell carcinoma pT3a, negative margins.  Lap right partial 12/20  BPH        Patient having slower stream for several years.  Some intermittency  of stream.  No straining.  Nocturia x 2.  Not bothersome.        No gross hematuria        5/24 CT abdomen with and without contrast - findings along the right paracolic gutter similar to previous exams favored to be related to fat necrosis.  5/24 1.2, GFR 65, chest x-ray - negative           No cardiopulmonary history.  Smoker,  0.5 pack/day.      No anticoagulation     PVR     5/24    068        previous        3/9/2023 PET -negative       8/22 chest x-ray - negative  8/22 1.2, GFR 70     8/23/2022 CT/PET-soft tissue density in the right pericolic gutter appears more contracted on today's examination with mild metabolic activity.  Fat interspersed in this area on CT 7/21.  May represent fat necrosis malignancy less likely        7/22 CT abdomen/pelvis with - postsurgical changes from partial nephrectomy right kidney.  No evidence of abnormality.  Increased nodular density in the inferior aspect of the right pericolic gutter compared to prior study.  Increased solid characteristic nodular components is noted.  Worrisome for potential malignancy.  Evaluate with PET/CT      7/9/2021 CT abdomen with - negative.     12/30/2020 laparoscopic right partial nephrectomy  path -4 cm papillary renal cell carcinoma, type II, G3, tumor extends in the perinephric tissues.  Margins negative, no lymph nodes found, pT3a    9/20 MRI abdomen with and without  - 3 cm right renal mass suspicious renal cell.  Several bilateral renal cyst.  No metastatic disease identified  9/20 CT chest/abdomen/pelvis with and without - 3.2 cm lesion exophytic right kidney.    No history of kidney stone.      Patient does have a lot of trouble with osteoarthritis.    10/20 creatinine 0.95, GFR 92    Grandfather with prostate cancer    DM treated with meds.  Objective     Past Medical History:   Diagnosis Date    Anemia, unspecified     Arthritis     Cancer     Chronic allergic rhinitis     Diabetes        Past Surgical History:   Procedure Laterality  Date    CARPAL TUNNEL RELEASE      NEPHRECTOMY PARTIAL Right 2020         Current Outpatient Medications:     alendronate (FOSAMAX) 70 MG tablet, Take 0.5 tablets by mouth Every 7 (Seven) Days., Disp: , Rfl:     buPROPion SR (WELLBUTRIN SR) 150 MG 12 hr tablet, Take 1 tablet by mouth Daily., Disp: , Rfl:     Enbrel SureClick 50 MG/ML solution auto-injector, , Disp: , Rfl:     FLUoxetine (PROzac) 40 MG capsule, Take 1 capsule by mouth Daily., Disp: , Rfl:     folic acid (FOLVITE) 1 MG tablet, Take 1 tablet by mouth Daily., Disp: , Rfl:     ibuprofen (ADVIL,MOTRIN) 800 MG tablet, Take 1 tablet by mouth 3 (Three) Times a Day With Meals. NULL, Disp: , Rfl:     metFORMIN ER (GLUCOPHAGE-XR) 500 MG 24 hr tablet, Take 2 tablets by mouth Daily., Disp: , Rfl:     methotrexate 2.5 MG tablet, TAKE 5 TABLETS BY MOUTH WEEKLY TAKE ALL TABLETS ON SAME DAY EACH WEEK, Disp: , Rfl:     omeprazole (priLOSEC) 40 MG capsule, Take 1 capsule by mouth Daily., Disp: , Rfl:     predniSONE (DELTASONE) 5 MG tablet, TAKE 2 TABLETS BY MOUTH IN THE MORNING, Disp: , Rfl:     Tirzepatide (Mounjaro) 2.5 MG/0.5ML solution pen-injector pen, Inject 0.5 mL under the skin into the appropriate area as directed 1 (One) Time Per Week., Disp: , Rfl:     loratadine (CLARITIN) 10 MG tablet, Take 10 mg by mouth Daily. (Patient not taking: Reported on 6/3/2025), Disp: , Rfl:     naproxen (NAPROSYN) 500 MG tablet, Take 500 mg by mouth 2 (Two) Times a Day With Meals. (Patient not taking: Reported on 5/21/2024), Disp: , Rfl:     No Known Allergies     Family History   Problem Relation Age of Onset    No Known Problems Mother     No Known Problems Father        Social History     Socioeconomic History    Marital status:    Tobacco Use    Smoking status: Every Day     Current packs/day: 1.00     Types: Cigarettes     Passive exposure: Current    Smokeless tobacco: Never   Vaping Use    Vaping status: Never Used   Substance and Sexual Activity    Alcohol use:  "Not Currently    Drug use: Defer    Sexual activity: Defer       Vital Signs:   Resp 14   Ht 188 cm (74.02\")   Wt 99.8 kg (220 lb 0.3 oz)   BMI 28.24 kg/m²      Physical Exam  Vitals reviewed.   Constitutional:       Appearance: Normal appearance.   Neurological:      General: No focal deficit present.      Mental Status: He is alert and oriented to person, place, and time.   Psychiatric:         Mood and Affect: Mood normal.         Behavior: Behavior normal.          Result Review :   The following data was reviewed by: VLAD Denson on 06/03/2025:  Bladder Scan interpretation 06/03/2025    Estimation of residual urine via BVI 3000 Verathon Bladder Scan  MA/nurse performing: Ileana DUFFY MA  Residual Urine: 0 ml  Indication: Benign prostatic hyperplasia with lower urinary tract symptoms, symptom details unspecified    History of renal cell carcinoma   Position: Supine  Examination: Incremental scanning of the suprapubic area using 2.0 MHz transducer using copious amounts of acoustic gel.   Findings: An anechoic area was demonstrated which represented the bladder, with measurement of residual urine as noted. I inspected this myself. In that the residual urine was stable or insignificant, refer to plan for treatment and plan necessary at this time.            Results  Imaging  CT scan showed no further studies needed for renal cell cancer. Bladder is unremarkable.      Procedures        Assessment and Plan    Diagnoses and all orders for this visit:    1. Benign prostatic hyperplasia with lower urinary tract symptoms, symptom details unspecified (Primary)  -     Bladder Scan  -     PSA DIAGNOSTIC; Future    2. History of renal cell carcinoma  -     Bladder Scan        Assessment & Plan  1. Benign Prostatic Hyperplasia (BPH).  His bladder scan yielded a score of zero, indicating effective emptying. He does not express significant discomfort from his symptoms. At present, there is no necessity for " pharmacological intervention for his prostate condition. A Prostate-Specific Antigen (PSA) test will be ordered to monitor his prostate health. The results of this test will be communicated to him. If the PSA levels are within the normal range, annual screening will be recommended. Should there be any changes in his symptoms, such as increased nocturnal urination or difficulty initiating urination, reconsideration of the treatment plan may be necessary.    Per Dr. Fox communications he does not need any further imaging for his renal cell carcinoma.    Follow-up  The patient will follow up in 1 year.      Follow Up   No follow-ups on file.  Patient was given instructions and counseling regarding his condition or for health maintenance advice. Please see specific information pulled into the AVS if appropriate.         This document has been electronically signed by VLAD Denson  Haydee 3, 2025 08:02 EDT     Patient or patient representative verbalized consent for the use of Ambient Listening during the visit with  VLAD Denson for chart documentation. 6/3/2025  08:08 EDT

## 2025-06-03 ENCOUNTER — OFFICE VISIT (OUTPATIENT)
Dept: UROLOGY | Age: 55
End: 2025-06-03
Payer: COMMERCIAL

## 2025-06-03 ENCOUNTER — LAB (OUTPATIENT)
Facility: HOSPITAL | Age: 55
End: 2025-06-03
Payer: COMMERCIAL

## 2025-06-03 VITALS — WEIGHT: 220.02 LBS | HEIGHT: 74 IN | RESPIRATION RATE: 14 BRPM | BODY MASS INDEX: 28.24 KG/M2

## 2025-06-03 DIAGNOSIS — N40.1 BENIGN PROSTATIC HYPERPLASIA WITH LOWER URINARY TRACT SYMPTOMS, SYMPTOM DETAILS UNSPECIFIED: ICD-10-CM

## 2025-06-03 DIAGNOSIS — Z85.528 HISTORY OF RENAL CELL CARCINOMA: ICD-10-CM

## 2025-06-03 DIAGNOSIS — N40.1 BENIGN PROSTATIC HYPERPLASIA WITH LOWER URINARY TRACT SYMPTOMS, SYMPTOM DETAILS UNSPECIFIED: Primary | ICD-10-CM

## 2025-06-03 LAB
PSA SERPL-MCNC: 0.45 NG/ML (ref 0–4)
URINE VOLUME: 0

## 2025-06-03 PROCEDURE — 51798 US URINE CAPACITY MEASURE: CPT | Performed by: NURSE PRACTITIONER

## 2025-06-03 PROCEDURE — 36415 COLL VENOUS BLD VENIPUNCTURE: CPT

## 2025-06-03 PROCEDURE — 84153 ASSAY OF PSA TOTAL: CPT

## 2025-06-03 PROCEDURE — 99213 OFFICE O/P EST LOW 20 MIN: CPT | Performed by: NURSE PRACTITIONER

## 2025-06-03 RX ORDER — MEDROXYPROGESTERONE ACETATE 150 MG/ML
INJECTION, SUSPENSION INTRAMUSCULAR
COMMUNITY
Start: 2025-05-07

## 2025-06-03 RX ORDER — OMEPRAZOLE 40 MG/1
1 CAPSULE, DELAYED RELEASE ORAL DAILY
COMMUNITY
Start: 2025-04-10

## 2025-06-03 RX ORDER — ALENDRONATE SODIUM 70 MG/1
35 TABLET ORAL
COMMUNITY

## 2025-06-04 ENCOUNTER — TELEPHONE (OUTPATIENT)
Dept: UROLOGY | Age: 55
End: 2025-06-04
Payer: COMMERCIAL

## 2025-06-04 NOTE — TELEPHONE ENCOUNTER
Spoke to patient and let him know that his PSA is within normal limits and it is recommended per Drea that he has it repeated yearly. Patient verbalized understanding